# Patient Record
Sex: MALE
[De-identification: names, ages, dates, MRNs, and addresses within clinical notes are randomized per-mention and may not be internally consistent; named-entity substitution may affect disease eponyms.]

---

## 2022-08-02 PROBLEM — Z00.00 ENCOUNTER FOR PREVENTIVE HEALTH EXAMINATION: Status: ACTIVE | Noted: 2022-08-02

## 2022-08-08 ENCOUNTER — APPOINTMENT (OUTPATIENT)
Dept: COLORECTAL SURGERY | Facility: CLINIC | Age: 47
End: 2022-08-08

## 2022-08-08 VITALS
BODY MASS INDEX: 29.73 KG/M2 | HEART RATE: 83 BPM | SYSTOLIC BLOOD PRESSURE: 144 MMHG | OXYGEN SATURATION: 97 % | WEIGHT: 185 LBS | RESPIRATION RATE: 14 BRPM | TEMPERATURE: 97.8 F | HEIGHT: 66 IN | DIASTOLIC BLOOD PRESSURE: 82 MMHG

## 2022-08-08 DIAGNOSIS — A63.0 ANOGENITAL (VENEREAL) WARTS: ICD-10-CM

## 2022-08-08 DIAGNOSIS — K62.82 DYSPLASIA OF ANUS: ICD-10-CM

## 2022-08-08 DIAGNOSIS — B20 HUMAN IMMUNODEFICIENCY VIRUS [HIV] DISEASE: ICD-10-CM

## 2022-08-08 PROCEDURE — 99203 OFFICE O/P NEW LOW 30 MIN: CPT | Mod: 25

## 2022-08-08 PROCEDURE — 46600 DIAGNOSTIC ANOSCOPY SPX: CPT

## 2022-08-08 RX ORDER — BICTEGRAVIR SODIUM, EMTRICITABINE, AND TENOFOVIR ALAFENAMIDE FUMARATE 50; 200; 25 MG/1; MG/1; MG/1
50-200-25 TABLET ORAL
Qty: 30 | Refills: 0 | Status: ACTIVE | COMMUNITY
Start: 2022-06-22

## 2022-08-08 RX ORDER — ERGOCALCIFEROL 1.25 MG/1
1.25 MG CAPSULE, LIQUID FILLED ORAL
Qty: 4 | Refills: 0 | Status: ACTIVE | COMMUNITY
Start: 2022-03-09

## 2022-08-08 RX ORDER — PILOCARPINE HYDROCHLORIDE 12.5 MG/ML
1.25 SOLUTION/ DROPS OPHTHALMIC
Qty: 2 | Refills: 0 | Status: ACTIVE | COMMUNITY
Start: 2022-02-11

## 2022-08-08 NOTE — HISTORY OF PRESENT ILLNESS
[FreeTextEntry1] : 47yoM who presents as a referral from his PCP after being found to have an anal pap smear with LSIL.  He presents today with his partner.  He endorses feeling some small bumps internally but denies bleeding.   No weight loss, changes to bowel habits or caliber of stool, or discharge from the area.  He has a history of HIV treated with Biktarvy but is otherwise healthy and has had no prior surgeries.

## 2022-08-08 NOTE — PROCEDURE
[FreeTextEntry1] : After informed consent was obtained, a lubricated lighted anoscope was inserted into the anal canal to evaluate for changes associated with HPV. The canal was inspected circumferentially up to the level above the dentate line.  The scope was withdrawn. The patient tolerated the procedure well.

## 2022-08-08 NOTE — PHYSICAL EXAM
[Respiratory Effort] : normal respiratory effort [Normal Rate and Rhythm] : normal rate and rhythm [No Edema] : No edema [No Rash or Lesion] : No rash or lesion [Alert] : alert [Oriented to Person] : oriented to person [Oriented to Place] : oriented to place [Oriented to Time] : oriented to time [Calm] : calm [JVD] : no jugular venous distention  [de-identified] : Soft, nontender, nondistended [de-identified] : External exam without fissure, fistula, excoriations, condyloma, or hemorrhoids.  BEAU without significant masses or tenderness. Anoscopy reveals patches of condyloma anteriorly and a small condyloma posteriorly. [de-identified] : No acute distress [de-identified] : Normocephalic [de-identified] : Moves all extremities

## 2022-08-09 ENCOUNTER — NON-APPOINTMENT (OUTPATIENT)
Age: 47
End: 2022-08-09

## 2022-10-13 ENCOUNTER — APPOINTMENT (OUTPATIENT)
Dept: COLORECTAL SURGERY | Facility: AMBULATORY SURGERY CENTER | Age: 47
End: 2022-10-13
Payer: MEDICAID

## 2022-10-13 ENCOUNTER — RESULT REVIEW (OUTPATIENT)
Age: 47
End: 2022-10-13

## 2022-10-13 PROCEDURE — 46607 DIAGNOSTIC ANOSCOPY & BIOPSY: CPT

## 2022-10-14 RX ORDER — TRAMADOL HYDROCHLORIDE 50 MG/1
50 TABLET, COATED ORAL
Qty: 20 | Refills: 0 | Status: ACTIVE | COMMUNITY
Start: 2022-10-14 | End: 1900-01-01

## 2023-11-15 ENCOUNTER — OFFICE (OUTPATIENT)
Dept: URBAN - METROPOLITAN AREA CLINIC 63 | Facility: CLINIC | Age: 48
Setting detail: OPHTHALMOLOGY
End: 2023-11-15
Payer: COMMERCIAL

## 2023-11-15 DIAGNOSIS — H52.03: ICD-10-CM

## 2023-11-15 DIAGNOSIS — H10.13: ICD-10-CM

## 2023-11-15 DIAGNOSIS — H25.13: ICD-10-CM

## 2023-11-15 DIAGNOSIS — H04.123: ICD-10-CM

## 2023-11-15 PROBLEM — H11.151 PINGUECULA; RIGHT EYE: Status: ACTIVE | Noted: 2023-11-15

## 2023-11-15 PROBLEM — H11.041 PTERYGIUM-PERIPHERAL; RIGHT EYE: Status: ACTIVE | Noted: 2023-11-15

## 2023-11-15 PROCEDURE — 92015 DETERMINE REFRACTIVE STATE: CPT | Performed by: STUDENT IN AN ORGANIZED HEALTH CARE EDUCATION/TRAINING PROGRAM

## 2023-11-15 PROCEDURE — 92014 COMPRE OPH EXAM EST PT 1/>: CPT | Performed by: STUDENT IN AN ORGANIZED HEALTH CARE EDUCATION/TRAINING PROGRAM

## 2023-11-15 ASSESSMENT — REFRACTION_MANIFEST
OD_VA1: 20/20
OS_VA2: 20/20
OS_SPHERE: +3.25
OD_ADD: +1.25
OD_VA2: 20/20
OD_AXIS: 165
OS_VA1: 20/20
OS_CYLINDER: -0.50
OS_CYLINDER: -0.50
OD_SPHERE: +0.25
OU_VA: 20/20
OD_CYLINDER: -0.25
OD_SPHERE: +3.25
OS_VA1: 20/20
OS_ADD: +1.25
OS_AXIS: 099
OD_VA1: 20/20
OS_SPHERE: +0.25
OD_CYLINDER: -0.75
OS_AXIS: 105
OD_AXIS: 095

## 2023-11-15 ASSESSMENT — SPHEQUIV_DERIVED
OD_SPHEQUIV: 0.125
OS_SPHEQUIV: 3
OD_SPHEQUIV: 1.625
OD_SPHEQUIV: 2.875
OS_SPHEQUIV: 0
OS_SPHEQUIV: 1.75

## 2023-11-15 ASSESSMENT — CONFRONTATIONAL VISUAL FIELD TEST (CVF)
OD_FINDINGS: FULL
OS_FINDINGS: FULL

## 2023-11-15 ASSESSMENT — REFRACTION_AUTOREFRACTION
OD_SPHERE: +2.00
OD_CYLINDER: -0.75
OS_SPHERE: +2.00
OD_AXIS: 097
OS_CYLINDER: -0.50
OS_AXIS: 105

## 2023-11-15 ASSESSMENT — CORNEAL PTERYGIUM: OD_PTERYGIUM: NASAL 1MM

## 2023-11-15 ASSESSMENT — LID EXAM ASSESSMENTS
OD_COMMENTS: 1+ MEIBOMIAN GLAND DYSFUNCTION
OS_COMMENTS: 1+ MEIBOMIAN GLAND DYSFUNCTION

## 2023-11-15 ASSESSMENT — SUPERFICIAL PUNCTATE KERATITIS (SPK)
OD_SPK: 1+
OS_SPK: 1+